# Patient Record
Sex: FEMALE | Race: OTHER | NOT HISPANIC OR LATINO
[De-identification: names, ages, dates, MRNs, and addresses within clinical notes are randomized per-mention and may not be internally consistent; named-entity substitution may affect disease eponyms.]

---

## 2020-06-02 PROBLEM — Z00.00 ENCOUNTER FOR PREVENTIVE HEALTH EXAMINATION: Status: ACTIVE | Noted: 2020-06-02

## 2020-06-18 ENCOUNTER — APPOINTMENT (OUTPATIENT)
Dept: OTOLARYNGOLOGY | Facility: CLINIC | Age: 85
End: 2020-06-18
Payer: MEDICARE

## 2020-06-18 ENCOUNTER — APPOINTMENT (OUTPATIENT)
Dept: OTOLARYNGOLOGY | Facility: CLINIC | Age: 85
End: 2020-06-18

## 2020-06-18 PROCEDURE — 92550 TYMPANOMETRY & REFLEX THRESH: CPT

## 2020-06-18 PROCEDURE — 92557 COMPREHENSIVE HEARING TEST: CPT

## 2020-06-18 PROCEDURE — 69210 REMOVE IMPACTED EAR WAX UNI: CPT

## 2020-06-18 PROCEDURE — 99204 OFFICE O/P NEW MOD 45 MIN: CPT | Mod: 25

## 2020-06-18 NOTE — REVIEW OF SYSTEMS
[Patient Intake Form Reviewed] : Patient intake form was reviewed [As Noted in HPI] : as noted in HPI [Ear Noises] : ear noises [Hearing Loss] : hearing loss [Negative] : Heme/Lymph

## 2020-06-30 NOTE — REASON FOR VISIT
[Initial Evaluation] : an initial evaluation for [Family Member] : family member [FreeTextEntry2] : Bilateral cerumen impaction for the past couple of months.  Patient states her level of severity is a level 7 out of 10 and it occurs constant.  Patient states nothing helps to improve or worsens her Bilateral cerumen impaction for the past couple of months.

## 2020-06-30 NOTE — CONSULT LETTER
[Dear  ___] : Dear  [unfilled], [Consult Letter:] : I had the pleasure of evaluating your patient, [unfilled]. [Please see my note below.] : Please see my note below. [Consult Closing:] : Thank you very much for allowing me to participate in the care of this patient.  If you have any questions, please do not hesitate to contact me. [Sincerely,] : Sincerely, [FreeTextEntry3] : Melvin Owen MD, FACS\par Professor of Otolaryngology, [facility] [employer] at [facility]\par Director, [facility], Department of Otolaryngology, [facility]\par , Head & Neck Service Line, [facility]\par

## 2020-06-30 NOTE — HISTORY OF PRESENT ILLNESS
[de-identified] : 88 years old female patient with history of Bilateral cerumen impaction for the past couple of months.  Patient is present today in the office with bilateral cerumen impaction and  Bilateral sensorineural hearing loss

## 2020-06-30 NOTE — PROCEDURE
[] : Removal of Cerumen [FreeTextEntry5] : Cerumen impaction was removed via operating head otoscope, ivan suction # 5 and ear curette

## 2020-06-30 NOTE — PHYSICAL EXAM
[Normal] : mucosa is normal [Midline] : trachea located in midline position [de-identified] : Yoselin SIBLEY

## 2020-12-29 ENCOUNTER — APPOINTMENT (OUTPATIENT)
Dept: OTOLARYNGOLOGY | Facility: CLINIC | Age: 85
End: 2020-12-29
Payer: MEDICARE

## 2020-12-29 ENCOUNTER — APPOINTMENT (OUTPATIENT)
Dept: PHARMACY | Facility: CLINIC | Age: 85
End: 2020-12-29
Payer: MEDICARE

## 2020-12-29 PROCEDURE — V5249D: CUSTOM

## 2020-12-29 PROCEDURE — 92557 COMPREHENSIVE HEARING TEST: CPT

## 2020-12-29 PROCEDURE — 92550 TYMPANOMETRY & REFLEX THRESH: CPT

## 2021-04-19 ENCOUNTER — APPOINTMENT (OUTPATIENT)
Dept: PHARMACY | Facility: CLINIC | Age: 86
End: 2021-04-19
Payer: MEDICARE

## 2021-04-19 PROCEDURE — V5011: CPT

## 2021-05-17 ENCOUNTER — APPOINTMENT (OUTPATIENT)
Dept: PHARMACY | Facility: CLINIC | Age: 86
End: 2021-05-17

## 2021-05-18 ENCOUNTER — NON-APPOINTMENT (OUTPATIENT)
Age: 86
End: 2021-05-18

## 2021-06-07 ENCOUNTER — APPOINTMENT (OUTPATIENT)
Dept: PHARMACY | Facility: CLINIC | Age: 86
End: 2021-06-07
Payer: MEDICARE

## 2021-06-07 PROCEDURE — V5299A: CUSTOM | Mod: NC

## 2021-06-28 ENCOUNTER — APPOINTMENT (OUTPATIENT)
Dept: PHARMACY | Facility: CLINIC | Age: 86
End: 2021-06-28
Payer: MEDICARE

## 2021-06-28 PROCEDURE — V5299A: CUSTOM | Mod: NC

## 2021-12-20 ENCOUNTER — APPOINTMENT (OUTPATIENT)
Dept: PHARMACY | Facility: CLINIC | Age: 86
End: 2021-12-20

## 2022-05-26 ENCOUNTER — APPOINTMENT (OUTPATIENT)
Dept: OTOLARYNGOLOGY | Facility: CLINIC | Age: 87
End: 2022-05-26
Payer: MEDICARE

## 2022-05-26 ENCOUNTER — APPOINTMENT (OUTPATIENT)
Dept: PHARMACY | Facility: CLINIC | Age: 87
End: 2022-05-26
Payer: MEDICARE

## 2022-05-26 VITALS
OXYGEN SATURATION: 97 % | DIASTOLIC BLOOD PRESSURE: 82 MMHG | HEIGHT: 55 IN | SYSTOLIC BLOOD PRESSURE: 144 MMHG | HEART RATE: 72 BPM | TEMPERATURE: 97.2 F | WEIGHT: 140 LBS | BODY MASS INDEX: 32.4 KG/M2

## 2022-05-26 PROCEDURE — V5299A: CUSTOM | Mod: NC

## 2022-05-26 PROCEDURE — G0268 REMOVAL OF IMPACTED WAX MD: CPT

## 2022-05-26 PROCEDURE — 99214 OFFICE O/P EST MOD 30 MIN: CPT | Mod: 25

## 2022-05-26 NOTE — ASSESSMENT
[FreeTextEntry1] : Rpt audio routinely (she is returning for a new HA fitting and will do it then). \par \par Discussed the importance not putting qtips or other foreign objects in the ears.\par

## 2022-05-26 NOTE — PHYSICAL EXAM
[Binocular Microscopic Exam] : Binocular microscopic exam was performed [FreeTextEntry8] : cerumen impaction removed with a hook [FreeTextEntry9] : Large cerumen plug was removed w/ a hook [Edentulous] : edentulous [Normal] : assessment of respiratory effort is normal

## 2022-05-26 NOTE — HISTORY OF PRESENT ILLNESS
[de-identified] : [place] speaker, her son translated & assisted w/ hx. \par Hearing loss for an indeterminate period of time & has bilateral HAs. Auds referral for impactions; uses qtips. Her son also has HAs. Denies: tinnitus, vertigo, ear pain, drainage, frequent loud noise exp/shooting, frequent infections, hx of ear surgery or IV antibiotics/chemo.

## 2022-05-26 NOTE — DATA REVIEWED
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 "Stable re 6/20" (though 6/20 audio not found)\par - Immitance testing w/ type A AU\par

## 2022-06-13 ENCOUNTER — APPOINTMENT (OUTPATIENT)
Dept: OTOLARYNGOLOGY | Facility: CLINIC | Age: 87
End: 2022-06-13
Payer: MEDICARE

## 2022-06-13 ENCOUNTER — APPOINTMENT (OUTPATIENT)
Dept: PHARMACY | Facility: CLINIC | Age: 87
End: 2022-06-13
Payer: MEDICARE

## 2022-06-13 VITALS
WEIGHT: 140 LBS | TEMPERATURE: 97.7 F | BODY MASS INDEX: 32.4 KG/M2 | HEART RATE: 78 BPM | HEIGHT: 55 IN | DIASTOLIC BLOOD PRESSURE: 66 MMHG | OXYGEN SATURATION: 99 % | SYSTOLIC BLOOD PRESSURE: 115 MMHG

## 2022-06-13 PROCEDURE — 99213 OFFICE O/P EST LOW 20 MIN: CPT

## 2022-06-13 PROCEDURE — 92550 TYMPANOMETRY & REFLEX THRESH: CPT

## 2022-06-13 PROCEDURE — 92557 COMPREHENSIVE HEARING TEST: CPT

## 2022-06-13 PROCEDURE — V5299A: CUSTOM | Mod: NC

## 2022-06-13 NOTE — DATA REVIEWED
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 "Stable re 6/20" (though 6/20 audio not found)\par - Immitance testing w/ type A AU\par today: mod-sev SNHL AU; confused w/ WR and unable to complete\par - Immitance testing w/ type A AU\par

## 2022-06-13 NOTE — HISTORY OF PRESENT ILLNESS
[de-identified] : Latvian speaker, her son translated & assisted w/ hx. \par Hearing loss for an indeterminate period of time & has bilateral HAs. Now s/p audio. Her son also has HAs. Denies: tinnitus, vertigo, ear pain, drainage, frequent loud noise exp/shooting, frequent infections, hx of ear surgery or IV antibiotics/chemo. \par Her son admits that she gets confused at times but doesn't think she has early dementia.

## 2022-06-20 ENCOUNTER — APPOINTMENT (OUTPATIENT)
Dept: OTOLARYNGOLOGY | Facility: CLINIC | Age: 87
End: 2022-06-20

## 2022-08-29 ENCOUNTER — APPOINTMENT (OUTPATIENT)
Dept: PHARMACY | Facility: CLINIC | Age: 87
End: 2022-08-29

## 2022-08-29 PROCEDURE — V5299A: CUSTOM | Mod: NC

## 2022-09-02 ENCOUNTER — NON-APPOINTMENT (OUTPATIENT)
Age: 87
End: 2022-09-02

## 2022-09-08 ENCOUNTER — NON-APPOINTMENT (OUTPATIENT)
Age: 87
End: 2022-09-08

## 2022-09-09 ENCOUNTER — APPOINTMENT (OUTPATIENT)
Dept: PHARMACY | Facility: CLINIC | Age: 87
End: 2022-09-09

## 2022-09-09 PROCEDURE — V5299A: CUSTOM | Mod: NC

## 2022-09-15 ENCOUNTER — APPOINTMENT (OUTPATIENT)
Dept: PHARMACY | Facility: CLINIC | Age: 87
End: 2022-09-15

## 2022-09-22 ENCOUNTER — APPOINTMENT (OUTPATIENT)
Dept: PHARMACY | Facility: CLINIC | Age: 87
End: 2022-09-22

## 2022-09-27 ENCOUNTER — APPOINTMENT (OUTPATIENT)
Dept: PHARMACY | Facility: CLINIC | Age: 87
End: 2022-09-27

## 2022-09-27 PROCEDURE — V5014E: CUSTOM

## 2023-01-05 ENCOUNTER — APPOINTMENT (OUTPATIENT)
Dept: OTOLARYNGOLOGY | Facility: CLINIC | Age: 88
End: 2023-01-05

## 2023-01-12 ENCOUNTER — APPOINTMENT (OUTPATIENT)
Dept: OTOLARYNGOLOGY | Facility: CLINIC | Age: 88
End: 2023-01-12
Payer: MEDICARE

## 2023-01-12 VITALS
OXYGEN SATURATION: 97 % | BODY MASS INDEX: 28.27 KG/M2 | HEIGHT: 60 IN | DIASTOLIC BLOOD PRESSURE: 72 MMHG | HEART RATE: 63 BPM | SYSTOLIC BLOOD PRESSURE: 132 MMHG | WEIGHT: 144 LBS | TEMPERATURE: 97.7 F

## 2023-01-12 PROCEDURE — G0268 REMOVAL OF IMPACTED WAX MD: CPT

## 2023-01-12 PROCEDURE — 92557 COMPREHENSIVE HEARING TEST: CPT

## 2023-01-12 PROCEDURE — 99213 OFFICE O/P EST LOW 20 MIN: CPT | Mod: 25

## 2023-01-12 PROCEDURE — 92550 TYMPANOMETRY & REFLEX THRESH: CPT | Mod: 52

## 2023-01-12 NOTE — PHYSICAL EXAM
[de-identified] : b copious cerumen impaction removed atraumatically with suction [Normal] : assessment of respiratory effort is normal [de-identified] : gait steady

## 2023-01-12 NOTE — DATA REVIEWED
[de-identified] : b snhl - r downsloping and similar to prior - l is worse in lfs but audiologist advised she is not sure and hl may be the same as 6/2022 when checked last

## 2023-01-12 NOTE — ASSESSMENT
[FreeTextEntry1] : cerumen removed\par hearing improved with ha significantly and she said she was back to baseline\par  questions new l hl but they are not sure\par discussed with pt and son- pt is comfortable - we agreed to leave as is\par asked to follow up in 6 mo to check ears

## 2023-02-28 ENCOUNTER — APPOINTMENT (OUTPATIENT)
Dept: PHARMACY | Facility: CLINIC | Age: 88
End: 2023-02-28
Payer: MEDICARE

## 2023-02-28 ENCOUNTER — APPOINTMENT (OUTPATIENT)
Dept: OTOLARYNGOLOGY | Facility: CLINIC | Age: 88
End: 2023-02-28
Payer: MEDICARE

## 2023-02-28 VITALS
DIASTOLIC BLOOD PRESSURE: 50 MMHG | SYSTOLIC BLOOD PRESSURE: 90 MMHG | BODY MASS INDEX: 25.52 KG/M2 | OXYGEN SATURATION: 97 % | TEMPERATURE: 97.9 F | HEIGHT: 60 IN | HEART RATE: 78 BPM | WEIGHT: 130 LBS

## 2023-02-28 PROCEDURE — V5299A: CUSTOM | Mod: NC

## 2023-02-28 PROCEDURE — 69210 REMOVE IMPACTED EAR WAX UNI: CPT

## 2023-02-28 PROCEDURE — 99213 OFFICE O/P EST LOW 20 MIN: CPT | Mod: 25

## 2023-02-28 NOTE — PHYSICAL EXAM
[Edentulous] : edentulous [Binocular Microscopic Exam] : Binocular microscopic exam was performed [Normal] : the left middle ear was normal [FreeTextEntry8] : cerumen impaction removed with suction after pretreatment w/ H2O2 [FreeTextEntry9] : cerumen impaction removed with suction after pretreatment w/ H2O2

## 2023-02-28 NOTE — DATA REVIEWED
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 "Stable re 6/20" (though 6/20 audio not found)\par - Immitance testing w/ type A AU\par 6/22: mod-sev SNHL AU; confused w/ WR and unable to complete\par - Immitance testing w/ type A AU\par 1/23: mod-prof HL AD, prof to mod-sev HL AS; poor reliability\par - Immitance testing w/ type A AU\par

## 2023-02-28 NOTE — HISTORY OF PRESENT ILLNESS
[de-identified] : Egyptian speaker, her son translated & assisted w/ hx. \par Hearing loss for an indeterminate period of time & has bilateral HAs; she came in today for a visit w/ audiology and was referred for an ear cleaning despite having been seen less than 2 months ago. Her son also has HAs. Denies: tinnitus, vertigo, ear pain, drainage, frequent loud noise exp/shooting, frequent infections, hx of ear surgery or IV antibiotics/chemo. \par Her son admits that she gets confused at times but doesn't think she has early dementia.

## 2023-04-13 ENCOUNTER — APPOINTMENT (OUTPATIENT)
Dept: PHARMACY | Facility: CLINIC | Age: 88
End: 2023-04-13
Payer: SELF-PAY

## 2023-04-13 PROCEDURE — V5299A: CUSTOM | Mod: NC

## 2023-06-05 ENCOUNTER — APPOINTMENT (OUTPATIENT)
Dept: OTOLARYNGOLOGY | Facility: CLINIC | Age: 88
End: 2023-06-05
Payer: MEDICARE

## 2023-06-05 VITALS
DIASTOLIC BLOOD PRESSURE: 67 MMHG | SYSTOLIC BLOOD PRESSURE: 165 MMHG | BODY MASS INDEX: 25.52 KG/M2 | OXYGEN SATURATION: 97 % | WEIGHT: 130 LBS | HEIGHT: 60 IN | HEART RATE: 48 BPM | TEMPERATURE: 97.6 F

## 2023-06-05 PROCEDURE — 99213 OFFICE O/P EST LOW 20 MIN: CPT | Mod: 25

## 2023-06-05 PROCEDURE — 69210 REMOVE IMPACTED EAR WAX UNI: CPT

## 2023-06-05 NOTE — DATA REVIEWED
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 "Stable re 6/20" (though 6/20 audio not found)\par - Immitance testing w/ type A AU\par 6/22: mod-sev SNHL AU; confused w/ WR and unable to complete\par - Immitance testing w/ type A AU\par 1/23: mod-prof HL AD, prof to mod-sev HL AS; poor reliability\par - Immitance testing w/ type A AU\par

## 2023-06-05 NOTE — ASSESSMENT
[FreeTextEntry1] : RTC for an ear cleaning in 6 months; annual audios, sooner prn any changes.\par  Wound Care: Petrolatum

## 2023-06-05 NOTE — HISTORY OF PRESENT ILLNESS
[de-identified] : Algerian speaker, her son translated & assisted w/ hx. \par Hearing loss for an indeterminate period of time & has bilateral HAs; she comes in today for a cleaning and recheck. Her son also has HAs. Denies: tinnitus, vertigo, ear pain, drainage, frequent loud noise exp/shooting, frequent infections, hx of ear surgery or IV antibiotics/chemo. \par

## 2023-06-05 NOTE — PHYSICAL EXAM
[Edentulous] : edentulous [Binocular Microscopic Exam] : Binocular microscopic exam was performed [Normal] : the left middle ear was normal [FreeTextEntry8] : cerumen impaction removed with suction  [FreeTextEntry9] : cerumen impaction removed with suction

## 2023-08-28 ENCOUNTER — APPOINTMENT (OUTPATIENT)
Dept: PHARMACY | Facility: CLINIC | Age: 88
End: 2023-08-28
Payer: MEDICARE

## 2023-08-28 ENCOUNTER — APPOINTMENT (OUTPATIENT)
Dept: OTOLARYNGOLOGY | Facility: CLINIC | Age: 88
End: 2023-08-28
Payer: MEDICARE

## 2023-08-28 DIAGNOSIS — T16.1XXA FOREIGN BODY IN RIGHT EAR, INITIAL ENCOUNTER: ICD-10-CM

## 2023-08-28 PROCEDURE — 69200 CLEAR OUTER EAR CANAL: CPT | Mod: RT

## 2023-08-28 PROCEDURE — V5299A: CUSTOM

## 2023-08-28 PROCEDURE — 99213 OFFICE O/P EST LOW 20 MIN: CPT | Mod: 25

## 2023-08-28 NOTE — PHYSICAL EXAM
[Edentulous] : edentulous [Binocular Microscopic Exam] : Binocular microscopic exam was performed [Normal] : the left middle ear was normal [FreeTextEntry8] : wet cerumen with HA filter embedded removed with suction and forcep [FreeTextEntry9] : cerumen impaction removed with suction

## 2023-08-28 NOTE — DATA REVIEWED
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 "Stable re 6/20" - Immitance testing w/ type A AU 6/22: mod-sev SNHL AU; confused w/ WR and unable to complete - Immitance testing w/ type A AU 1/23: mod-prof HL AD, prof to mod-sev HL AS; poor reliability - Immitance testing w/ type A AU

## 2023-08-28 NOTE — HISTORY OF PRESENT ILLNESS
[de-identified] : American speaker, her son translated & assisted w/ hx.  Hearing loss for an indeterminate period of time & has bilateral HAs; she came in today to see audiology and was referred to me because part of a HA was seen in her R EAC. Her hearing is stable. Her son also has HAs. Denies: tinnitus, vertigo, ear pain, drainage, frequent loud noise exp/shooting, frequent infections, hx of ear surgery or IV antibiotics/chemo.

## 2023-10-02 ENCOUNTER — APPOINTMENT (OUTPATIENT)
Dept: PHARMACY | Facility: CLINIC | Age: 88
End: 2023-10-02
Payer: SELF-PAY

## 2023-10-02 PROCEDURE — V5299A: CUSTOM

## 2023-12-11 ENCOUNTER — APPOINTMENT (OUTPATIENT)
Dept: OTOLARYNGOLOGY | Facility: CLINIC | Age: 88
End: 2023-12-11

## 2024-02-08 ENCOUNTER — APPOINTMENT (OUTPATIENT)
Dept: OTOLARYNGOLOGY | Facility: CLINIC | Age: 89
End: 2024-02-08
Payer: MEDICARE

## 2024-02-08 ENCOUNTER — APPOINTMENT (OUTPATIENT)
Dept: PHARMACY | Facility: CLINIC | Age: 89
End: 2024-02-08
Payer: MEDICARE

## 2024-02-08 VITALS
HEIGHT: 60 IN | SYSTOLIC BLOOD PRESSURE: 146 MMHG | BODY MASS INDEX: 18.18 KG/M2 | OXYGEN SATURATION: 98 % | TEMPERATURE: 97.7 F | DIASTOLIC BLOOD PRESSURE: 67 MMHG | WEIGHT: 92.59 LBS | HEART RATE: 59 BPM

## 2024-02-08 DIAGNOSIS — H61.23 IMPACTED CERUMEN, BILATERAL: ICD-10-CM

## 2024-02-08 DIAGNOSIS — H90.3 SENSORINEURAL HEARING LOSS, BILATERAL: ICD-10-CM

## 2024-02-08 PROCEDURE — 92567 TYMPANOMETRY: CPT

## 2024-02-08 PROCEDURE — 92557 COMPREHENSIVE HEARING TEST: CPT

## 2024-02-08 PROCEDURE — 99213 OFFICE O/P EST LOW 20 MIN: CPT | Mod: 25

## 2024-02-08 PROCEDURE — V5299A: CUSTOM | Mod: NC

## 2024-02-08 PROCEDURE — 69210 REMOVE IMPACTED EAR WAX UNI: CPT

## 2024-02-22 ENCOUNTER — TRANSCRIPTION ENCOUNTER (OUTPATIENT)
Age: 89
End: 2024-02-22

## 2024-11-05 ENCOUNTER — APPOINTMENT (OUTPATIENT)
Dept: PHARMACY | Facility: CLINIC | Age: 89
End: 2024-11-05
Payer: MEDICARE

## 2024-11-05 ENCOUNTER — NON-APPOINTMENT (OUTPATIENT)
Age: 89
End: 2024-11-05

## 2024-11-05 ENCOUNTER — APPOINTMENT (OUTPATIENT)
Dept: OTOLARYNGOLOGY | Facility: CLINIC | Age: 89
End: 2024-11-05
Payer: MEDICARE

## 2024-11-05 VITALS
WEIGHT: 93 LBS | BODY MASS INDEX: 18.26 KG/M2 | HEART RATE: 59 BPM | TEMPERATURE: 97.1 F | HEIGHT: 60 IN | OXYGEN SATURATION: 97 % | DIASTOLIC BLOOD PRESSURE: 70 MMHG | SYSTOLIC BLOOD PRESSURE: 126 MMHG

## 2024-11-05 DIAGNOSIS — H90.3 SENSORINEURAL HEARING LOSS, BILATERAL: ICD-10-CM

## 2024-11-05 DIAGNOSIS — H61.23 IMPACTED CERUMEN, BILATERAL: ICD-10-CM

## 2024-11-05 DIAGNOSIS — T16.1XXA FOREIGN BODY IN RIGHT EAR, INITIAL ENCOUNTER: ICD-10-CM

## 2024-11-05 PROCEDURE — 99213 OFFICE O/P EST LOW 20 MIN: CPT | Mod: 25

## 2024-11-05 PROCEDURE — V5299A: CUSTOM

## 2024-11-05 PROCEDURE — 69200 CLEAR OUTER EAR CANAL: CPT | Mod: RT

## 2025-03-06 ENCOUNTER — APPOINTMENT (OUTPATIENT)
Dept: OTOLARYNGOLOGY | Facility: CLINIC | Age: 89
End: 2025-03-06
Payer: MEDICARE

## 2025-03-06 ENCOUNTER — APPOINTMENT (OUTPATIENT)
Dept: PHARMACY | Facility: CLINIC | Age: 89
End: 2025-03-06
Payer: MEDICARE

## 2025-03-06 VITALS
DIASTOLIC BLOOD PRESSURE: 74 MMHG | HEART RATE: 60 BPM | BODY MASS INDEX: 24.94 KG/M2 | TEMPERATURE: 97.7 F | SYSTOLIC BLOOD PRESSURE: 167 MMHG | HEIGHT: 60 IN | WEIGHT: 127 LBS | OXYGEN SATURATION: 97 %

## 2025-03-06 DIAGNOSIS — H61.23 IMPACTED CERUMEN, BILATERAL: ICD-10-CM

## 2025-03-06 DIAGNOSIS — H90.3 SENSORINEURAL HEARING LOSS, BILATERAL: ICD-10-CM

## 2025-03-06 PROCEDURE — 92557 COMPREHENSIVE HEARING TEST: CPT

## 2025-03-06 PROCEDURE — 92550 TYMPANOMETRY & REFLEX THRESH: CPT | Mod: 52

## 2025-03-06 PROCEDURE — 99214 OFFICE O/P EST MOD 30 MIN: CPT | Mod: 25

## 2025-03-06 PROCEDURE — G0268 REMOVAL OF IMPACTED WAX MD: CPT

## 2025-03-06 PROCEDURE — V5299A: CUSTOM

## 2025-05-06 ENCOUNTER — APPOINTMENT (OUTPATIENT)
Dept: OTOLARYNGOLOGY | Facility: CLINIC | Age: 89
End: 2025-05-06

## 2025-09-04 ENCOUNTER — APPOINTMENT (OUTPATIENT)
Dept: OTOLARYNGOLOGY | Facility: CLINIC | Age: 89
End: 2025-09-04